# Patient Record
Sex: MALE | Race: AMERICAN INDIAN OR ALASKA NATIVE | NOT HISPANIC OR LATINO | Employment: PART TIME
[De-identification: names, ages, dates, MRNs, and addresses within clinical notes are randomized per-mention and may not be internally consistent; named-entity substitution may affect disease eponyms.]

---

## 2018-11-13 ENCOUNTER — RECORDS - HEALTHEAST (OUTPATIENT)
Dept: ADMINISTRATIVE | Facility: OTHER | Age: 28
End: 2018-11-13

## 2018-11-14 ENCOUNTER — RECORDS - HEALTHEAST (OUTPATIENT)
Dept: ADMINISTRATIVE | Facility: OTHER | Age: 28
End: 2018-11-14

## 2018-11-19 ENCOUNTER — RECORDS - HEALTHEAST (OUTPATIENT)
Dept: ADMINISTRATIVE | Facility: OTHER | Age: 28
End: 2018-11-19

## 2018-12-03 ENCOUNTER — RECORDS - HEALTHEAST (OUTPATIENT)
Dept: ADMINISTRATIVE | Facility: OTHER | Age: 28
End: 2018-12-03

## 2018-12-22 ENCOUNTER — HOME CARE/HOSPICE - HEALTHEAST (OUTPATIENT)
Dept: HOME HEALTH SERVICES | Facility: HOME HEALTH | Age: 28
End: 2018-12-22

## 2018-12-27 ENCOUNTER — OFFICE VISIT - HEALTHEAST (OUTPATIENT)
Dept: FAMILY MEDICINE | Facility: CLINIC | Age: 28
End: 2018-12-27

## 2018-12-27 DIAGNOSIS — R74.01 TRANSAMINITIS: ICD-10-CM

## 2018-12-27 DIAGNOSIS — Z87.81 HISTORY OF FRACTURE OF RIGHT ANKLE: ICD-10-CM

## 2018-12-27 DIAGNOSIS — N17.9 AKI (ACUTE KIDNEY INJURY) (H): ICD-10-CM

## 2018-12-27 DIAGNOSIS — L03.90 CELLULITIS, UNSPECIFIED CELLULITIS SITE: ICD-10-CM

## 2018-12-27 LAB
ALBUMIN SERPL-MCNC: 3.7 G/DL (ref 3.5–5)
ALP SERPL-CCNC: 130 U/L (ref 45–120)
ALT SERPL W P-5'-P-CCNC: 167 U/L (ref 0–45)
ANION GAP SERPL CALCULATED.3IONS-SCNC: 14 MMOL/L (ref 5–18)
AST SERPL W P-5'-P-CCNC: 189 U/L (ref 0–40)
BASOPHILS # BLD AUTO: 0.1 THOU/UL (ref 0–0.2)
BASOPHILS NFR BLD AUTO: 1 % (ref 0–2)
BILIRUB DIRECT SERPL-MCNC: 0.2 MG/DL
BILIRUB SERPL-MCNC: 0.6 MG/DL (ref 0–1)
BUN SERPL-MCNC: 9 MG/DL (ref 8–22)
CALCIUM SERPL-MCNC: 9.8 MG/DL (ref 8.5–10.5)
CHLORIDE BLD-SCNC: 105 MMOL/L (ref 98–107)
CO2 SERPL-SCNC: 21 MMOL/L (ref 22–31)
CREAT SERPL-MCNC: 0.94 MG/DL (ref 0.7–1.3)
EOSINOPHIL # BLD AUTO: 0.4 THOU/UL (ref 0–0.4)
EOSINOPHIL NFR BLD AUTO: 3 % (ref 0–6)
ERYTHROCYTE [DISTWIDTH] IN BLOOD BY AUTOMATED COUNT: 13.5 % (ref 11–14.5)
GFR SERPL CREATININE-BSD FRML MDRD: >60 ML/MIN/1.73M2
GLUCOSE BLD-MCNC: 105 MG/DL (ref 70–125)
HCT VFR BLD AUTO: 48.9 % (ref 40–54)
HGB BLD-MCNC: 15.9 G/DL (ref 14–18)
LYMPHOCYTES # BLD AUTO: 2.7 THOU/UL (ref 0.8–4.4)
LYMPHOCYTES NFR BLD AUTO: 24 % (ref 20–40)
MCH RBC QN AUTO: 30.6 PG (ref 27–34)
MCHC RBC AUTO-ENTMCNC: 32.5 G/DL (ref 32–36)
MCV RBC AUTO: 94 FL (ref 80–100)
MONOCYTES # BLD AUTO: 1 THOU/UL (ref 0–0.9)
MONOCYTES NFR BLD AUTO: 9 % (ref 2–10)
NEUTROPHILS # BLD AUTO: 7.1 THOU/UL (ref 2–7.7)
NEUTROPHILS NFR BLD AUTO: 63 % (ref 50–70)
PLATELET # BLD AUTO: 321 THOU/UL (ref 140–440)
PMV BLD AUTO: 12.9 FL (ref 8.5–12.5)
POTASSIUM BLD-SCNC: 4.3 MMOL/L (ref 3.5–5)
PROT SERPL-MCNC: 8.1 G/DL (ref 6–8)
RBC # BLD AUTO: 5.2 MILL/UL (ref 4.4–6.2)
SODIUM SERPL-SCNC: 140 MMOL/L (ref 136–145)
WBC: 11.4 THOU/UL (ref 4–11)

## 2019-01-03 ENCOUNTER — OFFICE VISIT - HEALTHEAST (OUTPATIENT)
Dept: FAMILY MEDICINE | Facility: CLINIC | Age: 29
End: 2019-01-03

## 2019-01-03 ENCOUNTER — COMMUNICATION - HEALTHEAST (OUTPATIENT)
Dept: HOME HEALTH SERVICES | Facility: HOME HEALTH | Age: 29
End: 2019-01-03

## 2019-01-03 ENCOUNTER — HOSPITAL ENCOUNTER (OUTPATIENT)
Dept: LAB | Age: 29
Setting detail: SPECIMEN
Discharge: HOME | End: 2019-01-03

## 2019-01-03 DIAGNOSIS — R05.9 COUGH: ICD-10-CM

## 2019-01-03 DIAGNOSIS — Z87.828 HISTORY OF KIDNEY INJURY: ICD-10-CM

## 2019-01-03 DIAGNOSIS — R79.89 ABNORMAL LFTS: ICD-10-CM

## 2019-01-03 DIAGNOSIS — L03.115 CELLULITIS OF RIGHT LOWER EXTREMITY: ICD-10-CM

## 2019-01-03 DIAGNOSIS — Z87.81 HISTORY OF FRACTURE OF RIGHT ANKLE: ICD-10-CM

## 2019-01-03 DIAGNOSIS — R21 FACIAL RASH: ICD-10-CM

## 2019-01-03 DIAGNOSIS — Z76.89 ESTABLISHING CARE WITH NEW DOCTOR, ENCOUNTER FOR: ICD-10-CM

## 2019-01-03 LAB
ALBUMIN SERPL-MCNC: 4.1 G/DL (ref 3.5–5)
ALP SERPL-CCNC: 126 U/L (ref 45–120)
ALT SERPL W P-5'-P-CCNC: 249 U/L (ref 0–45)
ANION GAP SERPL CALCULATED.3IONS-SCNC: 13 MMOL/L (ref 5–18)
AST SERPL W P-5'-P-CCNC: 252 U/L (ref 0–40)
BASOPHILS # BLD AUTO: 0.1 THOU/UL (ref 0–0.2)
BASOPHILS NFR BLD AUTO: 1 % (ref 0–2)
BILIRUB DIRECT SERPL-MCNC: 0.3 MG/DL
BILIRUB SERPL-MCNC: 0.8 MG/DL (ref 0–1)
BUN SERPL-MCNC: 10 MG/DL (ref 8–22)
CALCIUM SERPL-MCNC: 10.1 MG/DL (ref 8.5–10.5)
CHLORIDE BLD-SCNC: 105 MMOL/L (ref 98–107)
CO2 SERPL-SCNC: 22 MMOL/L (ref 22–31)
CREAT SERPL-MCNC: 0.83 MG/DL (ref 0.7–1.3)
EOSINOPHIL # BLD AUTO: 0.2 THOU/UL (ref 0–0.4)
EOSINOPHIL NFR BLD AUTO: 2 % (ref 0–6)
ERYTHROCYTE [DISTWIDTH] IN BLOOD BY AUTOMATED COUNT: 13.1 % (ref 11–14.5)
GFR SERPL CREATININE-BSD FRML MDRD: >60 ML/MIN/1.73M2
GLUCOSE BLD-MCNC: 85 MG/DL (ref 70–125)
HCT VFR BLD AUTO: 50 % (ref 40–54)
HGB BLD-MCNC: 16.5 G/DL (ref 14–18)
LYMPHOCYTES # BLD AUTO: 2.8 THOU/UL (ref 0.8–4.4)
LYMPHOCYTES NFR BLD AUTO: 25 % (ref 20–40)
MCH RBC QN AUTO: 30.6 PG (ref 27–34)
MCHC RBC AUTO-ENTMCNC: 33 G/DL (ref 32–36)
MCV RBC AUTO: 93 FL (ref 80–100)
MONOCYTES # BLD AUTO: 0.6 THOU/UL (ref 0–0.9)
MONOCYTES NFR BLD AUTO: 6 % (ref 2–10)
NEUTROPHILS # BLD AUTO: 7.4 THOU/UL (ref 2–7.7)
NEUTROPHILS NFR BLD AUTO: 67 % (ref 50–70)
PLATELET # BLD AUTO: 410 THOU/UL (ref 140–440)
PMV BLD AUTO: 13 FL (ref 8.5–12.5)
POTASSIUM BLD-SCNC: 4.4 MMOL/L (ref 3.5–5)
PROT SERPL-MCNC: 8.6 G/DL (ref 6–8)
RBC # BLD AUTO: 5.4 MILL/UL (ref 4.4–6.2)
SODIUM SERPL-SCNC: 140 MMOL/L (ref 136–145)
WBC: 11.1 THOU/UL (ref 4–11)

## 2019-01-03 ASSESSMENT — MIFFLIN-ST. JEOR: SCORE: 2309.36

## 2019-01-04 ENCOUNTER — RECORDS - HEALTHEAST (OUTPATIENT)
Dept: ADMINISTRATIVE | Facility: OTHER | Age: 29
End: 2019-01-04

## 2019-01-08 ENCOUNTER — COMMUNICATION - HEALTHEAST (OUTPATIENT)
Dept: FAMILY MEDICINE | Facility: CLINIC | Age: 29
End: 2019-01-08

## 2019-01-08 ENCOUNTER — AMBULATORY - HEALTHEAST (OUTPATIENT)
Dept: FAMILY MEDICINE | Facility: CLINIC | Age: 29
End: 2019-01-08

## 2019-01-08 DIAGNOSIS — R79.89 ABNORMAL LFTS: ICD-10-CM

## 2019-01-18 ENCOUNTER — COMMUNICATION - HEALTHEAST (OUTPATIENT)
Dept: FAMILY MEDICINE | Facility: CLINIC | Age: 29
End: 2019-01-18

## 2019-01-21 ENCOUNTER — OFFICE VISIT - HEALTHEAST (OUTPATIENT)
Dept: FAMILY MEDICINE | Facility: CLINIC | Age: 29
End: 2019-01-21

## 2019-01-21 ENCOUNTER — COMMUNICATION - HEALTHEAST (OUTPATIENT)
Dept: FAMILY MEDICINE | Facility: CLINIC | Age: 29
End: 2019-01-21

## 2019-01-21 ENCOUNTER — HOSPITAL ENCOUNTER (OUTPATIENT)
Dept: LAB | Age: 29
Setting detail: SPECIMEN
Discharge: HOME | End: 2019-01-21

## 2019-01-21 DIAGNOSIS — R31.0 GROSS HEMATURIA: ICD-10-CM

## 2019-01-21 DIAGNOSIS — Z87.81 HISTORY OF FRACTURE OF RIGHT ANKLE: ICD-10-CM

## 2019-01-21 DIAGNOSIS — R79.89 ABNORMAL LFTS: ICD-10-CM

## 2019-01-21 LAB
ALBUMIN SERPL-MCNC: 3.9 G/DL (ref 3.5–5)
ALBUMIN UR-MCNC: ABNORMAL MG/DL
ALP SERPL-CCNC: 108 U/L (ref 45–120)
ALT SERPL W P-5'-P-CCNC: 205 U/L (ref 0–45)
AMPHETAMINES UR QL SCN: ABNORMAL
APPEARANCE UR: CLEAR
AST SERPL W P-5'-P-CCNC: 172 U/L (ref 0–40)
BACTERIA #/AREA URNS HPF: ABNORMAL HPF
BARBITURATES UR QL: ABNORMAL
BENZODIAZ UR QL: ABNORMAL
BILIRUB DIRECT SERPL-MCNC: 0.3 MG/DL
BILIRUB SERPL-MCNC: 0.6 MG/DL (ref 0–1)
BILIRUB UR QL STRIP: ABNORMAL
CANNABINOIDS UR QL SCN: ABNORMAL
COCAINE UR QL: ABNORMAL
COLOR UR AUTO: YELLOW
CREAT UR-MCNC: 344.8 MG/DL
GLUCOSE UR STRIP-MCNC: NEGATIVE MG/DL
HGB UR QL STRIP: NEGATIVE
KETONES UR STRIP-MCNC: ABNORMAL MG/DL
LEUKOCYTE ESTERASE UR QL STRIP: NEGATIVE
METHADONE UR QL SCN: ABNORMAL
MUCOUS THREADS #/AREA URNS LPF: ABNORMAL LPF
NITRATE UR QL: NEGATIVE
OPIATES UR QL SCN: ABNORMAL
OXYCODONE UR QL: ABNORMAL
PCP UR QL SCN: ABNORMAL
PH UR STRIP: 6 [PH] (ref 5–8)
PROT SERPL-MCNC: 8 G/DL (ref 6–8)
RBC #/AREA URNS AUTO: ABNORMAL HPF
SP GR UR STRIP: >=1.03 (ref 1–1.03)
SQUAMOUS #/AREA URNS AUTO: ABNORMAL LPF
UROBILINOGEN UR STRIP-ACNC: ABNORMAL
WBC #/AREA URNS AUTO: ABNORMAL HPF

## 2019-01-21 ASSESSMENT — MIFFLIN-ST. JEOR: SCORE: 2318.43

## 2019-02-01 ENCOUNTER — COMMUNICATION - HEALTHEAST (OUTPATIENT)
Dept: FAMILY MEDICINE | Facility: CLINIC | Age: 29
End: 2019-02-01

## 2019-02-01 DIAGNOSIS — Z87.81 HISTORY OF FRACTURE OF RIGHT ANKLE: ICD-10-CM

## 2019-02-05 ENCOUNTER — COMMUNICATION - HEALTHEAST (OUTPATIENT)
Dept: FAMILY MEDICINE | Facility: CLINIC | Age: 29
End: 2019-02-05

## 2019-02-08 ENCOUNTER — OFFICE VISIT - HEALTHEAST (OUTPATIENT)
Dept: FAMILY MEDICINE | Facility: CLINIC | Age: 29
End: 2019-02-08

## 2019-02-08 ENCOUNTER — COMMUNICATION - HEALTHEAST (OUTPATIENT)
Dept: SCHEDULING | Facility: CLINIC | Age: 29
End: 2019-02-08

## 2019-02-08 DIAGNOSIS — L03.031 CELLULITIS OF TOE OF RIGHT FOOT: ICD-10-CM

## 2019-02-08 ASSESSMENT — MIFFLIN-ST. JEOR: SCORE: 2319.57

## 2019-02-11 ENCOUNTER — RECORDS - HEALTHEAST (OUTPATIENT)
Dept: ADMINISTRATIVE | Facility: OTHER | Age: 29
End: 2019-02-11

## 2019-02-19 ENCOUNTER — COMMUNICATION - HEALTHEAST (OUTPATIENT)
Dept: FAMILY MEDICINE | Facility: CLINIC | Age: 29
End: 2019-02-19

## 2019-02-19 DIAGNOSIS — Z87.81 HISTORY OF FRACTURE OF RIGHT ANKLE: ICD-10-CM

## 2019-04-03 ENCOUNTER — OFFICE VISIT - HEALTHEAST (OUTPATIENT)
Dept: FAMILY MEDICINE | Facility: CLINIC | Age: 29
End: 2019-04-03

## 2019-04-03 DIAGNOSIS — L08.9 RIGHT FOOT INFECTION: ICD-10-CM

## 2019-04-03 ASSESSMENT — MIFFLIN-ST. JEOR: SCORE: 2318.43

## 2019-04-17 ENCOUNTER — OFFICE VISIT - HEALTHEAST (OUTPATIENT)
Dept: FAMILY MEDICINE | Facility: CLINIC | Age: 29
End: 2019-04-17

## 2019-04-17 DIAGNOSIS — S82.54XD NONDISPLACED FRACTURE OF MEDIAL MALLEOLUS OF RIGHT TIBIA, SUBSEQUENT ENCOUNTER FOR CLOSED FRACTURE WITH ROUTINE HEALING: ICD-10-CM

## 2019-04-17 ASSESSMENT — MIFFLIN-ST. JEOR: SCORE: 2318.43

## 2021-05-27 NOTE — PROGRESS NOTES
Assessment/Plan:        1. Nondisplaced fracture of medial malleolus of right tibia, subsequent encounter for closed fracture with routine healing   I filled out papers for medical assistance for 45 days. He will need to get the orthopedic doctor to do further paperwork because they have a better understanding of the nature of this injury and limitations or activities that he can or can't do.  Follow up with orthopedic doctor for additional paperwork regarding this injury.               Subjective:    Patient ID: Elia Bucio is a 28 y.o. male.    HPI patient is here for medical assistant forms.  He saw me several weeks ago after that he reevaluated with his orthopedic doctor and had an emergency room visit.  They found an avulsion fracture of the bimalleolar tip minimally displaced.  He followed up with his orthopedic doctor who recommended that he be off work until July.  He is a  and lifts heavy furniture 10-12 hours/day.  Cellulitis of the right foot improved with Keflex and Bactroban.  He is not to follow-up with his orthopedic doctor for another 3 months.  Could not find any information from the orthopedic doctor within our [facility] system.    EXAM: XR ANKLE RIGHT 3 OR MORE VWS  LOCATION: [facility]  DATE/TIME: 4/4/2019 12:08 AM     INDICATION: Ankle pain, right  COMPARISON: 11/10/2018     FINDINGS: Remote ORIF right distal fibular fracture which is healed. Soft tissue swelling. Mortise intact. Avulsion at the medial malleolar tip, minimally displaced, not evident on prior could be acute, correlate with site of pain.    The following portions of the patient's history were reviewed and updated as appropriate: allergies, current medications, past family history, past medical history, past social history, past surgical history and problem list.    Review of Systems   Musculoskeletal:        Right foot pain, skin infection improved.              Objective:    Physical Exam  /74 (Patient Site:  "Left Arm, Patient Position: Sitting, Cuff Size: Adult Large)   Pulse 87   Resp 20   Ht 5' 10\" (1.778 m)   Wt (!) 297 lb (134.7 kg)   SpO2 96%   BMI 42.62 kg/m    Right foot: still has dry skin on plantar surface and a couple of healing crusted lesions.         "

## 2021-05-27 NOTE — PATIENT INSTRUCTIONS - HE
Aquaphor or Vaseline twice a day to right foot sores, then cover with guaze bandage. Recheck in 1 week if no improvement or worsening symptom.    Ibuprofen 400 mg every 6 hours as needed for pain, inflammation, or fever.

## 2021-05-27 NOTE — PROGRESS NOTES
"Assessment/Plan:        Diagnoses and all orders for this visit:    Right foot infection  -     cephalexin (KEFLEX) 500 MG capsule  Dispense: 40 capsule; Refill: 0  -     mupirocin (BACTROBAN) 2 % ointment  Dispense: 22 g; Refill: 0   Use Aquaphor or vaseline   Can use Ibuprofen for pain, tylenol not recommended due to liver function tests.     Follow up with PCP as needed for no improvement in symptoms in 1-2 weeks.        Subjective:    Patient ID: Elia Bucio is a 28 y.o. male.    HPI Patient is here for a right foot skin infection. Has been ongoing for awhile. Sores will open up and cause pain after walking, showering. Has sores on heel, and bottom of feet. No known history of DMT2. Fractured his right distal tibia on the ice on 11/19/2018 had surgery to put in pins and plate. Since then has had issues with cellulitis and skin infections. Has been on various abx (vanco, zosyn IV) oral keflex.  Has had elevated LFTs and acute kidney injury that his Doctors have been monitoring. Creatine and GFR were normal on 1/3/2019. Liver function tests were still elevated on 1/21/2019. Should avoid tylenol still. Ibuprofen would be fine.  No history of diabetes. Mother has diabetes.     The following portions of the patient's history were reviewed and updated as appropriate: allergies, current medications, past family history, past medical history, past social history, past surgical history and problem list.    Review of Systems   Musculoskeletal:        Right foot pain     Skin:        Right foot lesions               Objective:    Physical Exam  /78 (Patient Site: Left Arm, Patient Position: Sitting, Cuff Size: Adult Large)   Pulse 95   Temp 97.3  F (36.3  C) (Oral)   Resp 22   Ht 5' 10\" (1.778 m)   Wt (!) 297 lb (134.7 kg)   SpO2 97%   BMI 42.62 kg/m    Right foot: has multiple lesions with a crusting on all the lesions on heel and plantar surface of right foot. No open lesions. Foot is very dry.         "

## 2021-06-02 VITALS
BODY MASS INDEX: 40.18 KG/M2 | HEIGHT: 70 IN | HEIGHT: 70 IN | BODY MASS INDEX: 40.18 KG/M2 | BODY MASS INDEX: 40.18 KG/M2 | WEIGHT: 280 LBS | WEIGHT: 280 LBS | BODY MASS INDEX: 40.18 KG/M2

## 2021-06-02 VITALS — WEIGHT: 297 LBS | HEIGHT: 70 IN | BODY MASS INDEX: 42.52 KG/M2

## 2021-06-02 VITALS — HEIGHT: 70 IN | WEIGHT: 295 LBS | BODY MASS INDEX: 42.23 KG/M2

## 2021-06-02 VITALS — WEIGHT: 297.25 LBS | BODY MASS INDEX: 42.55 KG/M2 | HEIGHT: 70 IN

## 2021-06-02 VITALS — WEIGHT: 303 LBS | BODY MASS INDEX: 43.48 KG/M2

## 2021-06-16 NOTE — TELEPHONE ENCOUNTER
Telephone Encounter by Julian Wagner LPN at 2/1/2019  9:40 AM     Author: Julian Wagner LPN Service: -- Author Type: Licensed Nurse    Filed: 2/1/2019  9:42 AM Encounter Date: 2/1/2019 Status: Signed    : Julian Wagner LPN (Licensed Nurse)       Left message to call back for: pt medications  Information to relay to patient:  See message below.    Rosibel Danielson MD Highness, [facility] Care Team Pool 34 minutes ago (9:03 AM)      Please call patient: this is the last prescription of 30 tablets of oxycodone 5 mg. If patient needs further pain medication, needs office visit (Routing comment)

## 2021-06-18 NOTE — LETTER
Letter by Rosibel Danielson MD at      Author: Rosibel Danielson MD Service: -- Author Type: --    Filed:  Encounter Date: 2019 Status: (Other)       Elia Bucio  [address] [facility] [address]      2019      Dear Elia Bucio,   : 1990      This letter is in regards to the appointment that you had scheduled on  2019 at the [facility] with  Dr. Danielson.     The [facility] strives to see all patients in a timely manner and we need your help to achieve this.  The above-mentioned appointment was missed and we do not have record of a cancellation by you.  Whenever possible, we request appointment cancellations at least 72 hours in advance.  This time allows us to offer the appointment to another patient in need.      If you feel you have received this letter in error, or if you need to reschedule this appointment, please call our office so that we may update our records.      Sincerely,    [facility]

## 2021-06-18 NOTE — LETTER
Letter by Rosibel Danielson MD at      Author: Rosibel Danielson MD Service: -- Author Type: --    Filed:  Encounter Date: 1/8/2019 Status: (Other)       Elia Bucio  [address]  [facility] [address]             January 8, 2019         Dear Mr. Bucio,    Below are the results from your recent visit:    Resulted Orders   Hepatic Profile   Result Value Ref Range    Bilirubin, Total 0.8 0.0 - 1.0 mg/dL    Bilirubin, Direct 0.3 <=0.5 mg/dL    Protein, Total 8.6 (H) 6.0 - 8.0 g/dL    Albumin 4.1 3.5 - 5.0 g/dL    Alkaline Phosphatase 126 (H) 45 - 120 U/L     (H) 0 - 40 U/L     (H) 0 - 45 U/L   Basic Metabolic Panel   Result Value Ref Range    Sodium 140 136 - 145 mmol/L    Potassium 4.4 3.5 - 5.0 mmol/L    Chloride 105 98 - 107 mmol/L    CO2 22 22 - 31 mmol/L    Anion Gap, Calculation 13 5 - 18 mmol/L    Glucose 85 70 - 125 mg/dL    Calcium 10.1 8.5 - 10.5 mg/dL    BUN 10 8 - 22 mg/dL    Creatinine 0.83 0.70 - 1.30 mg/dL    GFR MDRD Af Amer >60 >60 mL/min/1.73m2    GFR MDRD Non Af Amer >60 >60 mL/min/1.73m2    Narrative    Fasting Glucose reference range is 70-99 mg/dL per  American Diabetes Association (ADA) guidelines.   HM1 (CBC with Diff)   Result Value Ref Range    WBC 11.1 (H) 4.0 - 11.0 thou/uL    RBC 5.40 4.40 - 6.20 mill/uL    Hemoglobin 16.5 14.0 - 18.0 g/dL    Hematocrit 50.0 40.0 - 54.0 %    MCV 93 80 - 100 fL    MCH 30.6 27.0 - 34.0 pg    MCHC 33.0 32.0 - 36.0 g/dL    RDW 13.1 11.0 - 14.5 %    Platelets 410 140 - 440 thou/uL    MPV 13.0 (H) 8.5 - 12.5 fL    Neutrophils % 67 50 - 70 %    Lymphocytes % 25 20 - 40 %    Monocytes % 6 2 - 10 %    Eosinophils % 2 0 - 6 %    Basophils % 1 0 - 2 %    Neutrophils Absolute 7.4 2.0 - 7.7 thou/uL    Lymphocytes Absolute 2.8 0.8 - 4.4 thou/uL    Monocytes Absolute 0.6 0.0 - 0.9 thou/uL    Eosinophils Absolute 0.2 0.0 - 0.4 thou/uL    Basophils Absolute 0.1 0.0 - 0.2 thou/uL        Liver enzymes continue to go up. I'm going to place a referral  for you to go see GI specialist.    Please come in for a follow up as scheduled.     Please call with questions or contact us using [employer].    Sincerely,        Electronically signed by Rosibel Danielson MD

## 2021-06-18 NOTE — LETTER
Letter by Rosibel Danielson MD at      Author: Rosibel Danielson MD Service: -- Author Type: --    Filed:  Encounter Date: 1/21/2019 Status: (Other)       Elia Bucio  [address] [facility] [address]             January 21, 2019         Dear Mr. Bucio,    Below are the results from your recent visit:    Resulted Orders   Urinalysis-UC if Indicated   Result Value Ref Range    Color, UA Yellow Colorless, Yellow, Straw, Light Yellow    Clarity, UA Clear Clear    Glucose, UA Negative Negative    Bilirubin, UA Small (!) Negative    Ketones, UA Trace (!) Negative    Specific Gravity, UA >=1.030 1.005 - 1.030    Blood, UA Negative Negative    pH, UA 6.0 5.0 - 8.0    Protein, UA 30 mg/dL (!) Negative mg/dL    Urobilinogen, UA 1.0 E.U./dL 0.2 E.U./dL, 1.0 E.U./dL    Nitrite, UA Negative Negative    Leukocytes, UA Negative Negative    Bacteria, UA Few (!) None Seen hpf    RBC, UA 0-2 None Seen, 0-2 hpf    WBC, UA 0-5 None Seen, 0-5 hpf    Squam Epithel, UA 0-5 None Seen, 0-5 lpf    Mucus, UA Many (!) None Seen lpf    Narrative    UC not indicated         No blood in urine and no sign of infection.         Please call with questions or contact us using [employer].    Sincerely,        Electronically signed by Rosibel Danielson MD

## 2021-06-18 NOTE — LETTER
Letter by Rosibel Danielson MD at      Author: Rosibel Danielson MD Service: -- Author Type: --    Filed:  Encounter Date: 2019 Status: (Other)       Elia Bucio  [address] [facility] [address]      2019      Dear Elia Bucio,   : 1990      This letter is in regards to the appointment that you had scheduled on  at the [facility] with Dr. Danielson.     The [facility] strives to see all patients in a timely manner and we need your help to achieve this.  The above-mentioned appointment was missed and we do not have record of a cancellation by you.  Whenever possible, we request appointment cancellations at least 72 hours in advance.  This time allows us to offer the appointment to another patient in need.      If you feel you have received this letter in error, or if you need to reschedule this appointment, please call our office so that we may update our records.      Sincerely,    [facility]

## 2021-06-22 NOTE — TELEPHONE ENCOUNTER
Left message to call back for: Results  Information to relay to patient:  MD's message below, letter sent

## 2021-06-22 NOTE — PROGRESS NOTES
ASSESSMENT/ PLAN    1. Abnormal LFTs  Reviewed recent hospitalization. Initially improving on hospital discharge but clinic visit on 12/27/18 worsened again.  Patient has not been taking any Tylenol or drinking alcohol.  - Hepatic Profile  - Basic Metabolic Panel    2. Cellulitis of right lower extremity  Resolved. Skin exam looks great today.  - HM1(CBC and Differential)  - HM1 (CBC with Diff)    3. History of fracture of right ankle  Had right ankle fracture, evaluated in the ED 11/10/2018.  Got ORIF 11/19/2018, now ongoing pain. MSK exam looks great today except pain with palpation of right ankle all over, but no more swelling.  Reviewed prescription monitoring database.  He has been getting quite a bit of narcotics ever since his ankle fracture from multiple providers both in the ER, [facility] and this clinic.  Discussed with patient about narcotic addiction and dependency and advised patient really weaned out.  Since he cannot take Tylenol due to abnormal LFT, I advised patient to take 600 mg of ibuprofen 3 times daily schedule and only add oxycodone 1-2 tablets daily as needed.  He needs to return in 2 weeks for follow-up with me.  ANDREW done for [facility] for review.   - oxyCODONE (ROXICODONE) 5 MG immediate release tablet; Take 1-2 tablets (5-10 mg total) by mouth daily as needed for pain.  Dispense: 30 tablet; Refill: 0    4. History of kidney injury  When he was in the hospital for LE right cellulitis.  - Basic Metabolic Panel    5. Cough  Ongoing for many months. Will trial albuterol inhaler. Could be undiagnosed asthma versus seasonal allergies. Lung exam normal today.   - albuterol (PROAIR HFA;PROVENTIL HFA;VENTOLIN HFA) 90 mcg/actuation inhaler; Inhale 2 puffs every 6 (six) hours as needed for wheezing.  Dispense: 1 each; Refill: 2    6. Facial rash  Looks like seborrheic dermatitis. Has hydrocortisone cream at home already. Will add ketoconazole. F/u in 2 weeks.   - ketoconazole  (NIZORAL) 2 % cream; Apply topically 2 (two) times a day.  Dispense: 60 g; Refill: 0      7.  Establishing care with new doctor, encounter for  Reviewed chart    Greater than 45 minutes was spent today with patient ,more than 50% of time was counseling and coordination of care on the above issues      This note was created using Dragon dictation software, spelling errors may occur.     Rosibel Danielson MD        SUBJECTIVE   Elia Bucio is a 28 y.o. old male with a past medical history of none who presented to clinic today for further evaluation of establishing care and also for follow up of abnormal LFT. He fractured his ankle 11/19/18, for ORIF, now struggling with ongoing pain. Has orthopedics follow-up visit tomorrow 1/4/2018.  Denies fever or chills.  He thinks his cellulitis in the right lower extremity is improving.  He has finished his antibiotics. He planes of ongoing pain in the right ankle.  He is wearing a cam boot.  He is currently not working.    Review of Systems:  Negative except as noted in HPI    The following portions of the patient's history were reviewed and updated as appropriate: past medical history, past surgical history, family history, allergies, current medications and problem list.    Medical History  Active Ambulatory (Non-Hospital) Problems    Diagnosis     Acute kidney injury (H)     History of fracture of right ankle     Sepsis, due to unspecified organism (H)     Abnormal LFTs     Morbid obesity (H)     Cellulitis     Past Medical History:   Diagnosis Date     PONV (postoperative nausea and vomiting)        Surgical History  He  has a past surgical history that includes Mandible fracture surgery.    Social History  Reviewed, and he  reports that  has never smoked. he has never used smokeless tobacco. He reports that he does not drink alcohol or use drugs.   Social History     Social History Narrative    He and his girlfriend have 6 children together.     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    Will look for a job after his right ankle fracture heals.          Family History  Reviewed, and family history includes Diabetes in his maternal aunt; No Medical Problems in his father and mother.    Medications  Reviewed and reconciled    Allergies  No Known Allergies      OBJECTIVE  Physical Exam:  Vital signs: /74 (Patient Site: Left Arm, Patient Position: Sitting, Cuff Size: Adult Large)   Pulse 92   Temp 98.4  F (36.9  C) (Oral)   Ht 5' 10\" (1.778 m)   Wt (!) 295 lb (133.8 kg)   BMI 42.33 kg/m    Weight: (!) 295 lb (133.8 kg)    General appearance: pleasant, appears stated age, cooperative and in no distress  Eyes: EOMs intact, PERRL, conjunctivae normal.   ENT: moist oral mucosa, posterior oropharynx normal. Thyroid normal to palpation, no lump or mass or tenderness  Lymph: no cervical/supraclavicular adenopathy  Respiratory: clear to auscultation bilaterally, good air movement throughout, no wheezing or crackles, speaking full sentences without difficulty  Cardiovascular: regular rate and rhythm, no murmur appreciated, no leg edema  Musculoskeletal: warm and well perfused, strong and symmetric dorsalis pedal pulses, strength 5/5 and equal bilaterally, right ankle tender all over but no swelling.   Skin: no rashes  Neuro: alert oriented x 3, grossly normal otherwise  Psych: normal affect, appropriate conversation     "

## 2021-06-22 NOTE — TELEPHONE ENCOUNTER
Request for Orders  Patient was referred to [facility] by [facility]. Patient did not have a PCP at time of referral so [facility] could not start services until PCP has been established. Patient has appt with Dr. Danielson today to establish care. If after this appt patient needs home care services still, please submit new order and we will contact patient to set up a start date. If no home care services are needed for patient, we will disregard current referral.     Who s Requesting: [facility]    Orders being requested: SN    Where to send Orders: Simply respond to this Epic Telephone Call message string, and we can take as a verbal order if appropriate.   Thank you.

## 2021-06-22 NOTE — TELEPHONE ENCOUNTER
----- Message from Rosibel Danielson MD sent at 1/8/2019  7:52 AM CST -----  Please call patient:   Liver enzymes continue to go up. I'm going to place a referral for him to go see GI specialist.   Please come in for follow up as scheduled.

## 2021-06-22 NOTE — TELEPHONE ENCOUNTER
Patient Returning Call  Reason for call:  results  Information relayed to patient:  Dr. Danielson's message  Patient has additional questions:  No  If YES, what are your questions/concerns:  n/a  Okay to leave a detailed message?: No call back needed

## 2021-06-23 NOTE — TELEPHONE ENCOUNTER
Patient Returning Call  Reason for call:  Patient called back.  Information relayed to patient:  Informed of Dr Danielson message listed below.  Patient states understanding and agrees with plan.  Patient has additional questions:  No  If YES, what are your questions/concerns:    Okay to leave a detailed message?: No call back needed

## 2021-06-23 NOTE — PROGRESS NOTES
Chief Complaint   Patient presents with     Foot Problem     onset 3 days but is a recurring problem since ankle surgery R foot         HPI:   Elia Bucio is a 28 y.o. male with wife c/o outer three toes on right foot pain for the last week.  Has had drainage. Stubbed baby toe before this happened this time.  No fever.  Feels tired.  No nausea/vomiting.  No diarrhea.    Patient had ankle surgery in November and was in a splint.  Between toes became macerated and infected.  Was in hospital late December with IV antibiotics.  Discharged on keflex.  Blood cultures negative. ID recommended outpatient keflex    ROS:  A 10 point comprehensive review of systems was negative except as noted.     Medications:  Current Outpatient Medications on File Prior to Visit   Medication Sig Dispense Refill     ibuprofen (ADVIL,MOTRIN) 600 MG tablet Take 1 tablet (600 mg total) by mouth every 8 (eight) hours as needed for pain. 9 tablet 0     ketoconazole (NIZORAL) 2 % cream Apply topically 2 (two) times a day. 60 g 0     oxyCODONE (ROXICODONE) 5 MG immediate release tablet Take 1-2 tablets (5-10 mg total) by mouth daily as needed for pain. 30 tablet 0     albuterol (PROAIR HFA;PROVENTIL HFA;VENTOLIN HFA) 90 mcg/actuation inhaler Inhale 2 puffs every 6 (six) hours as needed for wheezing. 1 each 2     aspirin 81 mg chewable tablet Chew 324 mg every evening X 30 days(last day 12/18/18) .       oxyCODONE (ROXICODONE) 5 MG immediate release tablet Take 1 tablet (5 mg total) by mouth 2 (two) times a day as needed for pain. 30 tablet 0     No current facility-administered medications on file prior to visit.          Social History:  Social History     Tobacco Use     Smoking status: Never Smoker     Smokeless tobacco: Never Used     Tobacco comment: no passive exposure   Substance Use Topics     Alcohol use: No     Frequency: Never         Physical Exam:   Vitals:    02/08/19 1514   BP: 128/76   Patient Site: Left Arm   Patient  "Position: Sitting   Cuff Size: Adult Regular   Pulse: (!) 130   Temp: 97.5  F (36.4  C)   TempSrc: Oral   SpO2: 97%   Weight: (!) 297 lb 4 oz (134.8 kg)   Height: 5' 10\" (1.778 m)       GEN:  NAD  Right foot: slight swelling. Normal DP pulse.  Erythema between 3/4/5 toes with cracking of skin and some drainage.  Erythema distal 5th toe with drainage.        Assessment/Plan:    1. Cellulitis of toe of right foot  cephalexin 500 mg tablet      Restart keflex for 10 days.  Elevate foot.  Dress as needed.  Dry between toes.    May have a chronic tinea pedis with maceration that gets secondarily infected.    Recheck if worsening or not improving.          Iam Boykin MD      2/8/2019    The following portions of the patient's history were reviewed and updated as appropriate: allergies, current medications, past family history, past medical history, past social history, past surgical history and problem list.      "

## 2021-06-23 NOTE — PROGRESS NOTES
ASSESSMENT/ PLAN    1. Abnormal LFTs  Has appointment with GI a week from now.  Has not been taking any Tylenol or drinking any alcohol.  We will recheck liver function today.  - Hepatic Profile  - Drug Abuse 1+, Urine    2. History of fracture of right ankle  Started physical therapy this week.  Last visit with orthopedics 1/4/2019.  I am willing to give him another refill for oxycodone 5 mg we will give him 30 tablets and I instructed him to use it once or twice daily as needed.  He needs to use heating pads 3 times a day for 20 minutes each time to help with his right ankle pain.  He also needs to schedule ibuprofen 800 mg 3 times a day as well.  I advised him that I can give him another refill of oxycodone when this refill runs out; however if he needs ongoing oxycodone he needs to follow up in clinic.  Drug screen is done today.  He admitted to me of using marijuana last night because of his significant other's brother just committed suicide last week.  I told him that is okay if I find marijuana this time but this is his only pass. I will not be able to give him narcotics if I find marijuana in the future. Otherwise RTC in 3-4 months for fasting annual physical  - oxyCODONE (ROXICODONE) 5 MG immediate release tablet; Take 1 tablet (5 mg total) by mouth 2 (two) times a day as needed for pain.  Dispense: 30 tablet; Refill: 0  - Drug Abuse 1+, Urine    3. Gross hematuria  1 episode last week of blood specks and burning with urination and low back pain but symptoms have resolved since.   - Urinalysis-UC if Indicated          This note was created using Dragon dictation software, spelling errors may occur.     Rosibel Danielson MD        SUBJECTIVE   Elia Bucio is a 28 y.o. old male with a past medical history of morbid obesity who presented to clinic today for further evaluation of follow-up of right ankle pain as well as abnormal LFT.  He was last seen by me on 1/3/2019 and was given 30 tablets of oxycodone 5  mg for ongoing right ankle pain status post right ankle fracture and ORIF in November 2018.  His last visit with orthopedics was 1/4/2019 and I reviewed orthopedic notes.  He started on weightbearing exercises and was told to follow-up in about 6 weeks.  He was noted to have abnormal LFTs during hospitalization for cellulitis in December 2018 involving the right ankle and foot and since then has not been taking any Tylenol or drinking any alcohol.  When I saw him on 1/3/2019, his liver function kept going up. He has appointment with GI next week. Needs recheck of LFT today.  Also he has run out of the oxycodone 5 mg.  He has been taking it pretty much daily since the last time I saw him.  His last dose was about 3 days ago.  He admits to using some marijuana as his significant other's brother just committed suicide last week so has been hard on him and his significant other.  He denies any other illicit drug use.  He started physical therapy for his right ankle pain and previous surgery and his pain has been increasing.  He is also concerned about blood specks in his urine a week ago as well as burning with urination and some low back pain but the symptoms have resolved since.      Review of Systems:  Negative except as noted in HPI     The following portions of the patient's history were reviewed and updated as appropriate: past medical history, past surgical history, family history, allergies, current medications and problem list.    Medical History  Active Ambulatory (Non-Hospital) Problems    Diagnosis     Cough     Facial rash     History of kidney injury     History of fracture of right ankle     Sepsis, due to unspecified organism (H)     Abnormal LFTs     Morbid obesity (H)     Cellulitis     Past Medical History:   Diagnosis Date     PONV (postoperative nausea and vomiting)        Surgical History  He  has a past surgical history that includes Mandible fracture surgery and ORIF ankle fracture.    Social  "History  Reviewed, and he  reports that  has never smoked. he has never used smokeless tobacco. He reports that he does not drink alcohol or use drugs.    Family History  Reviewed, and family history includes Diabetes in his maternal aunt; No Medical Problems in his father and mother.    Medications  Reviewed and reconciled    Allergies  No Known Allergies      OBJECTIVE  Physical Exam:  Vital signs: /81 (Patient Site: Left Arm, Patient Position: Sitting, Cuff Size: Adult Large)   Pulse 77   Temp 96.7  F (35.9  C) (Oral)   Ht 5' 10\" (1.778 m)   Wt (!) 297 lb (134.7 kg)   BMI 42.62 kg/m    Weight: (!) 297 lb (134.7 kg)    General appearance: pleasant, appears stated age, cooperative and in no distress  Eyes: EOMs intact, PERRL, conjunctivae normal.   ENT: moist oral mucosa, posterior oropharynx normal.  Lymph: no cervical/supraclavicular adenopathy  Respiratory: clear to auscultation bilaterally, good air movement throughout, no wheezing or crackles, speaking full sentences without difficulty  Cardiovascular: regular rate and rhythm, no murmur appreciated, no leg edema  Abdomen: active bowel sounds, soft, non-tender, non-distended, no CVA tenderness.  Skin: no rashes  Neuro: alert oriented x 3, grossly normal otherwise  Psych: normal affect, appropriate conversation     "

## 2021-06-23 NOTE — TELEPHONE ENCOUNTER
Reason for Disposition    [1] Redness AND [2] painful when touched AND [3] no fever    Protocols used: ANKLE SWELLING-A-AH

## 2021-06-23 NOTE — TELEPHONE ENCOUNTER
Patient Returning Call  Reason for call:  Results  Information relayed to patient:  Writer read the following to patient per Dr Danielson: No blood in urine and no sign of infection.   Patient has additional questions:  Yes  If YES, what are your questions/concerns:  Please call when results of hepatic panel come back.  Okay to leave a detailed message?: Yes

## 2021-06-23 NOTE — TELEPHONE ENCOUNTER
Left message to call back for: Test results  Information to relay to patient:  Please inform Pt of Dr Danielson's message. Will send a copy of results in mail.  LORETTA WeeksA

## 2021-06-23 NOTE — TELEPHONE ENCOUNTER
Previous injury was on right foot and now its back and looks swollen and infected.    No fever, or chills    Has had cellilitis there before and previous fracture with surgery.    Appointment scheduled for today.    Sasha Cruz RN, [facility] Nurse Triage/Med Refills RN

## 2021-06-23 NOTE — TELEPHONE ENCOUNTER
----- Message from Rosibel Danielson MD sent at 1/21/2019  1:52 PM CST -----  Please call patient:  No blood in urine and no sign of infection.     Please send result letter with above message.

## 2021-06-23 NOTE — TELEPHONE ENCOUNTER
Controlled Substance Refill Request  Medication Name:   Requested Prescriptions     Pending Prescriptions Disp Refills     oxyCODONE (ROXICODONE) 5 MG immediate release tablet 30 tablet 0     Sig: Take 1-2 tablets (5-10 mg total) by mouth daily as needed for pain.     Date Last Fill: 1/3/2019  Pharmacy: [facility] #[id number]      Submit electronically to pharmacy  Controlled Substance Agreement Date Scanned:   Encounter-Level CSA Scan Date:    There are no encounter-level csa scan date.       Last office visit with prescriber/PCP: 1/21/2019 Rosibel Danielson MD OR same dept: 1/21/2019 Rosible Danielson MD OR same specialty: 1/21/2019 Rosibel Danielson MD  Last physical: Visit date not found Last MTM visit: Visit date not found

## 2021-06-23 NOTE — TELEPHONE ENCOUNTER
Who is calling:   Patient  Reason for Call:  See below notes- Patient is scheduled for office visit for medication follow up 02/04/19 with Dr Danielson.  Date of last appointment with primary care: none  Has the patient been recently seen:  No  Okay to leave a detailed message: Yes

## 2021-06-23 NOTE — TELEPHONE ENCOUNTER
Left message to call back for: No call back needed  Information to relay to patient:  Medication was refilled, no call back needed, closing encounter now

## 2021-06-24 NOTE — TELEPHONE ENCOUNTER
I will not be able to refill controlled substance at this point. I have discussed with him that further appointment is needed if he needed more narcotics.

## 2021-06-24 NOTE — TELEPHONE ENCOUNTER
Controlled Substance Refill Request  Medication Name:   Requested Prescriptions     Pending Prescriptions Disp Refills     oxyCODONE (ROXICODONE) 5 MG immediate release tablet 30 tablet 0     Sig: Take 1-2 tablets (5-10 mg total) by mouth daily as needed for pain.     Date Last Fill: 2/1/2019  Pharmacy: [facility] #[id number]      Submit electronically to pharmacy  Controlled Substance Agreement Date Scanned:   Encounter-Level CSA Scan Date:    There are no encounter-level csa scan date.       Last office visit with prescriber/PCP: 1/21/2019 Rosibel Danielson MD OR same dept: 1/21/2019 Rosibel Danielson MD OR same specialty: 2/8/2019 Iam Boykin MD  Last physical: Visit date not found Last MTM visit: Visit date not found      Patient reports he is out of medication and saw Eladio Boykin MD on 2/8/2019 and reports his toe pain is worsening. Next appt was 3/4/2019 for Rosibel Danielson MD therefore he is requesting refill without OV.    Please contact if patient needs to be seen.
